# Patient Record
(demographics unavailable — no encounter records)

---

## 2024-12-06 NOTE — PHYSICAL EXAM
[Chaperone Present] : A chaperone was present in the examining room during all aspects of the physical examination [71989] : A chaperone was present during the pelvic exam. [Appropriately responsive] : appropriately responsive [Alert] : alert [No Acute Distress] : no acute distress [No Lymphadenopathy] : no lymphadenopathy [Regular Rate Rhythm] : regular rate rhythm [No Murmurs] : no murmurs [Clear to Auscultation B/L] : clear to auscultation bilaterally [Soft] : soft [Non-tender] : non-tender [Non-distended] : non-distended [No HSM] : No HSM [No Lesions] : no lesions [No Mass] : no mass [Oriented x3] : oriented x3 [FreeTextEntry2] : OVIDIO ROBBINS

## 2024-12-06 NOTE — HISTORY OF PRESENT ILLNESS
[Patient reported colonoscopy was normal] : Patient reported colonoscopy was normal [HIV test declined] : HIV test declined [Syphilis test declined] : Syphilis test declined [Gonorrhea test declined] : Gonorrhea test declined [Chlamydia test declined] : Chlamydia test declined [Trichomonas test declined] : Trichomonas test declined [HPV test declined] : HPV test declined [Hepatitis B test declined] : Hepatitis B test declined [Hepatitis C test declined] : Hepatitis C test declined [N] : Patient does not use contraception [Y] : Positive pregnancy history [Currently Active] : currently active [Men] : men [No] : No [Patient refuses STI testing] : Patient refuses STI testing [TextBox_4] : PATIENT IS HERE FOR ANNUAL EXAMINATION LMP 2011 [Mammogramdate] : 01/4/2024 [BreastSonogramDate] :  12/29/2022 [PapSmeardate] : 05/20/2024 [BoneDensityDate] : 01/04/2024 [ColonoscopyDate] : 2020 [TextBox_43] : PT [place] [LMPDate] : 2011 [PGHxTotal] : 3 [[facility]] : 2 [[facility]Full[facility]] : 2 [PGHxABSpont] : 1 [TextBox_6] : 2011 [TextBox_9] : 11 [FreeTextEntry1] : 2011

## 2024-12-06 NOTE — REVIEW OF SYSTEMS
[Patient Intake Form Reviewed] : Patient intake form was reviewed [Negative] : Heme/Lymph [Anxiety] : anxiety [Hot Flashes] : hot flashes

## 2024-12-06 NOTE — PLAN
[FreeTextEntry1] : Pap smear completed patient will have mammogram repeat bone density in 1 year continue vitamin D3 multivitamin Replens for vaginal moisturizing will continue paroxetine 20 mg daily again risk-benefit analysis pros cons discussed in layman's terms and questions answered return in 6 months for repeat Pap smear